# Patient Record
Sex: FEMALE | Race: BLACK OR AFRICAN AMERICAN | NOT HISPANIC OR LATINO | Employment: PART TIME | URBAN - METROPOLITAN AREA
[De-identification: names, ages, dates, MRNs, and addresses within clinical notes are randomized per-mention and may not be internally consistent; named-entity substitution may affect disease eponyms.]

---
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
## 2022-01-30 ENCOUNTER — HEALTH MAINTENANCE LETTER (OUTPATIENT)
Age: 22
End: 2022-01-30

## 2022-09-24 ENCOUNTER — HEALTH MAINTENANCE LETTER (OUTPATIENT)
Age: 22
End: 2022-09-24

## 2023-05-08 ENCOUNTER — HEALTH MAINTENANCE LETTER (OUTPATIENT)
Age: 23
End: 2023-05-08

## 2023-09-12 ENCOUNTER — APPOINTMENT (OUTPATIENT)
Dept: RADIOLOGY | Facility: HOSPITAL | Age: 23
End: 2023-09-12
Payer: COMMERCIAL

## 2023-09-12 ENCOUNTER — NURSE TRIAGE (OUTPATIENT)
Dept: NURSING | Facility: CLINIC | Age: 23
End: 2023-09-12

## 2023-09-12 ENCOUNTER — HOSPITAL ENCOUNTER (EMERGENCY)
Facility: HOSPITAL | Age: 23
Discharge: HOME | End: 2023-09-12
Admitting: STUDENT IN AN ORGANIZED HEALTH CARE EDUCATION/TRAINING PROGRAM
Payer: COMMERCIAL

## 2023-09-12 VITALS
WEIGHT: 135 LBS | OXYGEN SATURATION: 98 % | HEIGHT: 65 IN | HEART RATE: 83 BPM | BODY MASS INDEX: 22.49 KG/M2 | RESPIRATION RATE: 20 BRPM | SYSTOLIC BLOOD PRESSURE: 121 MMHG | TEMPERATURE: 98.2 F | DIASTOLIC BLOOD PRESSURE: 62 MMHG

## 2023-09-12 DIAGNOSIS — B34.9 VIRAL ILLNESS: ICD-10-CM

## 2023-09-12 LAB
ATRIAL RATE - MUSE: 67 BPM
BASOPHILS # BLD AUTO: 0 10E3/UL (ref 0–0.2)
BASOPHILS NFR BLD AUTO: 1 %
DIASTOLIC BLOOD PRESSURE - MUSE: NORMAL MMHG
EOSINOPHIL # BLD AUTO: 0.1 10E3/UL (ref 0–0.7)
EOSINOPHIL NFR BLD AUTO: 1 %
ERYTHROCYTE [DISTWIDTH] IN BLOOD BY AUTOMATED COUNT: 11.9 % (ref 10–15)
FLUAV RNA SPEC QL NAA+PROBE: NEGATIVE
FLUBV RNA RESP QL NAA+PROBE: NEGATIVE
GROUP A STREP BY PCR: NOT DETECTED
HCT VFR BLD AUTO: 40.2 % (ref 35–47)
HGB BLD-MCNC: 13.1 G/DL (ref 11.7–15.7)
IMM GRANULOCYTES # BLD: 0 10E3/UL
IMM GRANULOCYTES NFR BLD: 0 %
INTERPRETATION ECG - MUSE: NORMAL
LYMPHOCYTES # BLD AUTO: 2.5 10E3/UL (ref 0.8–5.3)
LYMPHOCYTES NFR BLD AUTO: 46 %
MCH RBC QN AUTO: 30.1 PG (ref 26.5–33)
MCHC RBC AUTO-ENTMCNC: 32.6 G/DL (ref 31.5–36.5)
MCV RBC AUTO: 92 FL (ref 78–100)
MONOCYTES # BLD AUTO: 0.4 10E3/UL (ref 0–1.3)
MONOCYTES NFR BLD AUTO: 7 %
NEUTROPHILS # BLD AUTO: 2.4 10E3/UL (ref 1.6–8.3)
NEUTROPHILS NFR BLD AUTO: 45 %
NRBC # BLD AUTO: 0 10E3/UL
NRBC BLD AUTO-RTO: 0 /100
P AXIS - MUSE: 50 DEGREES
PLATELET # BLD AUTO: 201 10E3/UL (ref 150–450)
PR INTERVAL - MUSE: 178 MS
QRS DURATION - MUSE: 82 MS
QT - MUSE: 390 MS
QTC - MUSE: 412 MS
R AXIS - MUSE: 63 DEGREES
RBC # BLD AUTO: 4.35 10E6/UL (ref 3.8–5.2)
RSV RNA SPEC NAA+PROBE: NEGATIVE
SARS-COV-2 RNA RESP QL NAA+PROBE: NEGATIVE
SYSTOLIC BLOOD PRESSURE - MUSE: NORMAL MMHG
T AXIS - MUSE: 28 DEGREES
TROPONIN T SERPL HS-MCNC: <6 NG/L
VENTRICULAR RATE- MUSE: 67 BPM
WBC # BLD AUTO: 5.4 10E3/UL (ref 4–11)

## 2023-09-12 PROCEDURE — 84484 ASSAY OF TROPONIN QUANT: CPT

## 2023-09-12 PROCEDURE — 36415 COLL VENOUS BLD VENIPUNCTURE: CPT

## 2023-09-12 PROCEDURE — 85025 COMPLETE CBC W/AUTO DIFF WBC: CPT

## 2023-09-12 PROCEDURE — 87637 SARSCOV2&INF A&B&RSV AMP PRB: CPT | Performed by: STUDENT IN AN ORGANIZED HEALTH CARE EDUCATION/TRAINING PROGRAM

## 2023-09-12 PROCEDURE — 99285 EMERGENCY DEPT VISIT HI MDM: CPT | Mod: 25

## 2023-09-12 PROCEDURE — 93005 ELECTROCARDIOGRAM TRACING: CPT

## 2023-09-12 PROCEDURE — 87651 STREP A DNA AMP PROBE: CPT | Performed by: STUDENT IN AN ORGANIZED HEALTH CARE EDUCATION/TRAINING PROGRAM

## 2023-09-12 PROCEDURE — 71046 X-RAY EXAM CHEST 2 VIEWS: CPT

## 2023-09-12 RX ORDER — BENZONATATE 100 MG/1
100 CAPSULE ORAL 3 TIMES DAILY PRN
Qty: 20 CAPSULE | Refills: 0 | Status: SHIPPED | OUTPATIENT
Start: 2023-09-12 | End: 2023-09-13

## 2023-09-12 RX ORDER — GUAIFENESIN 600 MG/1
1200 TABLET, EXTENDED RELEASE ORAL 2 TIMES DAILY
Qty: 20 TABLET | Refills: 0 | Status: SHIPPED | OUTPATIENT
Start: 2023-09-12 | End: 2023-09-13

## 2023-09-12 ASSESSMENT — ENCOUNTER SYMPTOMS
COUGH: 1
CHILLS: 1
SHORTNESS OF BREATH: 1
CHEST TIGHTNESS: 1

## 2023-09-12 NOTE — ED TRIAGE NOTES
"The pt has had a cough since 9/2/2023. No hx of asthma. Pt was seen on 9/2 and covid was negative. Pt is coughing up clear mucus. The pt states that she feel more sob, \"I coughed so much I felt exhausted. \" Denies fevers; pt does feel like she has chills.         "

## 2023-09-12 NOTE — ED PROVIDER NOTES
EMERGENCY DEPARTMENT ENCOUNTER      NAME: Smith Rogel  AGE: 23 year old female  YOB: 2000  MRN: [id number]  EVALUATION DATE & TIME: No admission date for patient encounter.    PCP: Magalis Sanchez    ED PROVIDER: Tierra Patterson PA-C      Chief Complaint   Patient presents with    Cough    Shortness of Breath     FINAL IMPRESSION:  1. Viral illness        ED COURSE & MEDICAL DECISION MAKING:    Pertinent Labs & Imaging studies reviewed. (See chart for details)  23 year old female presents to the Emergency Department for evaluation of cough,  congestion and shortness of breath.  Patient reports that she was seen in urgent care on 9/2/2023 for cough.  At that time she had a negative COVID test.  Vital signs reviewed and unremarkable.  Afebrile.  Patient is satting at 98% on room air.  On exam, cardiac and pulm exams unremarkable.  Patient is speaking in full sentences.  No sinus tenderness.  No rhinorrhea.  Posterior oropharynx is nonerythematous.  Uvula is midline.  Trachea is midline.  Normal range of motion of neck.  Abdomen is soft and nontender.  No rash. Exam is unremarkable.     Differential diagnosis includes COVID, influenza, RSV, viral illness, pneumonia, pneumothorax, hemothorax, ACS, pericarditis, myocarditis.  PE unlikely due to the patient being PERC negative.  No birth control.  Influenza, RSV, COVID was negative.  Strep was negative.  CBC showed no white blood cell elevation.  Troponin is not elevated.  Delta troponin is not indicated at this time.  EKG shows no STEMI.  Chest x-ray shows lungs are clear.  Heart and pulmonary vascularity are normal.  No signs of acute disease.  Resting comfortably throughout the ED visit.  Was educated on her imaging and lab results.    Symptoms consistent with a viral illness.  She was prescribed Tessalon Perles and Mucinex to help with her coughing.  Follow-up with her primary care doctor discuss her ED visit.  Patient return to the ED if new  symptoms develop or symptoms worsen.  Patient agrees with plan.  All questions answered.      ED COURSE:   2:29 PM I saw the patient.   3:47 PM I updated the patient on her imaging and lab results.  Patient be discharged home.  Patient agrees with plan.  All questions answered.       At the conclusion of the encounter I discussed the results of all of the tests and the disposition. The questions were answered. The patient or family acknowledged understanding and was agreeable with the care plan.     0 minutes of critical care time       Additional Documentation    History:  Supplemental history from: Documented in chart, if applicable  External Record(s) reviewed: Documented in chart, if applicable.    Work Up:  Chart documentation includes differential considered and any EKGs or imaging interpreted by provider.  In additional to work up documented, I considered the following work up: Documented in chart, if applicable.    External consultation:  Discussion of management with another provider: Documented in chart, if applicable    Complicating factors:  Care impacted by chronic illness: N/A  Care affected by social determinants of health: N/A    Disposition considerations: Discharge. I prescribed additional prescription strength medication(s) as charted. See documentation for any additional details.      MEDICATIONS GIVEN IN THE EMERGENCY:  Medications - No data to display    NEW PRESCRIPTIONS STARTED AT TODAY'S ER VISIT  New Prescriptions    BENZONATATE (TESSALON) 100 MG CAPSULE    Take 1 capsule (100 mg) by mouth 3 times daily as needed for cough    GUAIFENESIN (MUCINEX) 600 MG 12 HR TABLET    Take 2 tablets (1,200 mg) by mouth 2 times daily      =================================================================    HPI    Patient information was obtained from: patient    Use of : N/A       Smith Rogel is a 23 year old female with no pertinent history who presents to this ED via self walk-in for  evaluation of cough.    Patient reports persistent cough for the past 3 weeks. She states that she was seen in  on 9/2/23 and had a negative covid test and was advise to return for worsening symptoms. Last night (9/11/23), she states the coughing was so persistent she had a hard time catching her breath. She also had some production of clear phlegm with the cough. She associates minimal shortness of breath, chills, and chest tightness. She has been using Dayquil, Nyquil, Claritin, and benadryl with no relief. She is not on birth control.     Denies congestion, rhinorrhea, neck pain, abdominal pain, nausea, vomiting, dizziness, headache, lightheadedness. There were no other concerns/complaints at this time.      REVIEW OF SYSTEMS   Review of Systems   Constitutional:  Positive for chills.   HENT:  Positive for congestion.    Respiratory:  Positive for cough, chest tightness and shortness of breath (minimal, secondary to cough).    All other systems reviewed and are negative.       PAST MEDICAL HISTORY:  Past Medical History:   Diagnosis Date    NO ACTIVE PROBLEMS        PAST SURGICAL HISTORY:  Past Surgical History:   Procedure Laterality Date    NO PAST SURGERIES      none       CURRENT MEDICATIONS:    benzonatate (TESSALON) 100 MG capsule  guaiFENesin (MUCINEX) 600 MG 12 hr tablet  naproxen (NAPROSYN) 500 MG tablet  polyethylene glycol (MIRALAX/GLYCOLAX) powder    ALLERGIES:  No Known Allergies    FAMILY HISTORY:  History reviewed. No pertinent family history.    SOCIAL HISTORY:   Social History     Socioeconomic History    Marital status: Single   Tobacco Use    Smoking status: Never    Smokeless tobacco: Never    Tobacco comments:     smoke free household.   Substance and Sexual Activity    Alcohol use: No    Drug use: No    Sexual activity: Never   Social History Narrative    ** Merged History Encounter **            VITALS:  /62 (Cuff Size: Adult Regular)   Pulse 83   Temp 98.2  F (36.8  C)   Resp 20  "  Ht 1.651 m (5' 5\")   Wt 61.2 kg (135 lb)   SpO2 98%   BMI 22.47 kg/m      PHYSICAL EXAM    Physical Exam  Vitals and nursing note reviewed.   Constitutional:       General: She is not in acute distress.     Appearance: Normal appearance. She is not ill-appearing, toxic-appearing or diaphoretic.      Interventions: She is not intubated.     Comments: Speaking in full sentences.    HENT:      Head: Atraumatic.      Jaw: There is normal jaw occlusion.      Right Ear: Hearing, tympanic membrane, ear canal and external ear normal.      Left Ear: Hearing, tympanic membrane, ear canal and external ear normal.      Nose: Nose normal.      Right Sinus: No maxillary sinus tenderness or frontal sinus tenderness.      Left Sinus: No maxillary sinus tenderness or frontal sinus tenderness.      Mouth/Throat:      Mouth: Mucous membranes are moist.      Tongue: No lesions. Tongue does not deviate from midline.      Palate: No mass and lesions.      Pharynx: Oropharynx is clear. Uvula midline. No pharyngeal swelling, oropharyngeal exudate, posterior oropharyngeal erythema or uvula swelling.      Tonsils: No tonsillar exudate or tonsillar abscesses.   Eyes:      Conjunctiva/sclera: Conjunctivae normal.      Pupils: Pupils are equal, round, and reactive to light.   Neck:      Trachea: Trachea and phonation normal.   Cardiovascular:      Rate and Rhythm: Normal rate and regular rhythm.      Pulses: Normal pulses.      Heart sounds: Normal heart sounds. No murmur heard.     No friction rub. No gallop.   Pulmonary:      Effort: Pulmonary effort is normal. No tachypnea, bradypnea, accessory muscle usage or respiratory distress. She is not intubated.      Breath sounds: Normal breath sounds. No stridor. No decreased breath sounds, wheezing, rhonchi or rales.   Chest:      Chest wall: No mass, deformity, tenderness, crepitus or edema. There is no dullness to percussion.   Abdominal:      General: Bowel sounds are normal.      " Palpations: Abdomen is soft.      Tenderness: There is no abdominal tenderness. There is no guarding or rebound.   Musculoskeletal:         General: Normal range of motion.      Cervical back: Full passive range of motion without pain and normal range of motion.   Lymphadenopathy:      Cervical: No cervical adenopathy.   Skin:     General: Skin is dry.      Capillary Refill: Capillary refill takes less than 2 seconds.      Findings: No rash.   Neurological:      General: No focal deficit present.      Mental Status: She is alert and oriented to person, place, and time. Mental status is at baseline.   Psychiatric:         Mood and Affect: Mood normal.         Thought Content: Thought content normal.          LAB:  All pertinent labs reviewed and interpreted.  Labs Ordered and Resulted from Time of ED Arrival to Time of ED Departure   INFLUENZA A/B, RSV, & SARS-COV2 PCR - Normal       Result Value    Influenza A PCR Negative      Influenza B PCR Negative      RSV PCR Negative      SARS CoV2 PCR Negative     TROPONIN T, HIGH SENSITIVITY - Normal    Troponin T, High Sensitivity <6     GROUP A STREPTOCOCCUS PCR THROAT SWAB - Normal    Group A strep by PCR Not Detected     CBC WITH PLATELETS AND DIFFERENTIAL    WBC Count 5.4      RBC Count 4.35      Hemoglobin 13.1      Hematocrit 40.2      MCV 92      MCH 30.1      MCHC 32.6      RDW 11.9      Platelet Count 201      % Neutrophils 45      % Lymphocytes 46      % Monocytes 7      % Eosinophils 1      % Basophils 1      % Immature Granulocytes 0      NRBCs per 100 WBC 0      Absolute Neutrophils 2.4      Absolute Lymphocytes 2.5      Absolute Monocytes 0.4      Absolute Eosinophils 0.1      Absolute Basophils 0.0      Absolute Immature Granulocytes 0.0      Absolute NRBCs 0.0          RADIOLOGY:  Reviewed all pertinent imaging. Please see official radiology report.  Chest XR,  PA & LAT   Final Result   IMPRESSION: Lungs are clear. Heart and pulmonary vascularity are  normal. No signs of acute disease.          EKG:    Performed at: 9/12/2023 14:46:35  Impression: Sinus rhythm with marked sinus arrhythmia with occasional premature ventricular complexes with junctional escape complexes, Possible Left atrial enlargement, No STEMI, Borderline ECG  Rate: 67  Rhythm: Sinus rhythm  Axis: 63  OK Interval: 178  QRS Interval: 82  QTc Interval: 412  ST Changes: None  Comparison: No previous ECG for comparison  Dr. Painter have independently reviewed and interpreted the EKG(s) documented above.      I, Ashley Head, am serving as a scribe to document services personally performed by Tierra Patterson PA-C, based on my observation and the provider's statements to me. I, Tierra Patterson PA-C, attest that Ashley Head is acting in a scribe capacity, has observed my performance of the services and has documented them in accordance with my direction.    Tierra Patterson PA-C  [facility] EMERGENCY DEPARTMENT  [address] [place] [id number]  [phone]     Tierra Patterson PA-C  09/12/23 [id number]

## 2023-09-12 NOTE — DISCHARGE INSTRUCTIONS
Rest.  Drink lots of water.  Take Mucinex as needed for your congestion.  Take Tessalon Perles for coughing.  Return to the ED if new symptoms develop or symptoms worsen.  Follow-up with your primary care doctor to discuss your ED visit.

## 2023-09-13 RX ORDER — GUAIFENESIN 600 MG/1
1200 TABLET, EXTENDED RELEASE ORAL 2 TIMES DAILY
Qty: 20 TABLET | Refills: 0 | Status: SHIPPED | OUTPATIENT
Start: 2023-09-13 | End: 2023-12-26

## 2023-09-13 RX ORDER — BENZONATATE 100 MG/1
100 CAPSULE ORAL 3 TIMES DAILY PRN
Qty: 20 CAPSULE | Refills: 0 | Status: SHIPPED | OUTPATIENT
Start: 2023-09-13 | End: 2023-12-26

## 2023-09-13 NOTE — TELEPHONE ENCOUNTER
"Triage Call:    Caller: Patient  Patient seen in the ER today.  Her lab results are in the chart and \"someone updated the ECG\".  She is wondering if she should be concerned.  Advised that  the cardiologist reading it doesn't have all of the other assessment pieces taht the ER provider had to make an assessment.  Reinforced they advised to follow ER provider interpretation.   Advised to make ER follow-up visit with PCP as advised.     Protocol Recommended Disposition: Home care    Caller verbalized understanding of instructions and questions answered.      Yumi Velasco RN on 9/12/2023 at 7:26 PM      Reason for Disposition    Caller requesting routine or non-urgent lab result    Protocols used: PCP Call - No Triage-A-    "

## 2023-12-18 ENCOUNTER — OFFICE VISIT (OUTPATIENT)
Dept: FAMILY MEDICINE | Facility: CLINIC | Age: 23
End: 2023-12-18
Payer: COMMERCIAL

## 2023-12-18 VITALS
TEMPERATURE: 98.4 F | WEIGHT: 148 LBS | SYSTOLIC BLOOD PRESSURE: 113 MMHG | DIASTOLIC BLOOD PRESSURE: 74 MMHG | HEIGHT: 66 IN | RESPIRATION RATE: 20 BRPM | OXYGEN SATURATION: 98 % | HEART RATE: 84 BPM | BODY MASS INDEX: 23.78 KG/M2

## 2023-12-18 DIAGNOSIS — M79.604 PAIN IN BOTH LOWER EXTREMITIES: ICD-10-CM

## 2023-12-18 DIAGNOSIS — G25.81 RESTLESS LEG SYNDROME: Primary | ICD-10-CM

## 2023-12-18 DIAGNOSIS — H61.22 IMPACTED CERUMEN OF LEFT EAR: ICD-10-CM

## 2023-12-18 DIAGNOSIS — M79.605 PAIN IN BOTH LOWER EXTREMITIES: ICD-10-CM

## 2023-12-18 LAB
ALBUMIN UR-MCNC: 30 MG/DL
APPEARANCE UR: CLEAR
BILIRUB UR QL STRIP: ABNORMAL
COLOR UR AUTO: YELLOW
ERYTHROCYTE [DISTWIDTH] IN BLOOD BY AUTOMATED COUNT: 11.8 % (ref 10–15)
FERRITIN SERPL-MCNC: 40 NG/ML (ref 6–175)
GLUCOSE UR STRIP-MCNC: NEGATIVE MG/DL
HCT VFR BLD AUTO: 40.5 % (ref 35–47)
HGB BLD-MCNC: 13.1 G/DL (ref 11.7–15.7)
HGB UR QL STRIP: NEGATIVE
KETONES UR STRIP-MCNC: NEGATIVE MG/DL
LEUKOCYTE ESTERASE UR QL STRIP: NEGATIVE
MCH RBC QN AUTO: 30 PG (ref 26.5–33)
MCHC RBC AUTO-ENTMCNC: 32.3 G/DL (ref 31.5–36.5)
MCV RBC AUTO: 93 FL (ref 78–100)
NITRATE UR QL: NEGATIVE
PH UR STRIP: 6.5 [PH] (ref 5–7)
PLATELET # BLD AUTO: 205 10E3/UL (ref 150–450)
RBC # BLD AUTO: 4.37 10E6/UL (ref 3.8–5.2)
SP GR UR STRIP: >=1.03 (ref 1–1.03)
UROBILINOGEN UR STRIP-ACNC: 1 E.U./DL
WBC # BLD AUTO: 5.4 10E3/UL (ref 4–11)

## 2023-12-18 PROCEDURE — 82728 ASSAY OF FERRITIN: CPT

## 2023-12-18 PROCEDURE — 99214 OFFICE O/P EST MOD 30 MIN: CPT | Mod: GC

## 2023-12-18 PROCEDURE — 81003 URINALYSIS AUTO W/O SCOPE: CPT

## 2023-12-18 PROCEDURE — 36415 COLL VENOUS BLD VENIPUNCTURE: CPT

## 2023-12-18 PROCEDURE — 85027 COMPLETE CBC AUTOMATED: CPT

## 2023-12-18 NOTE — PROGRESS NOTES
Preceptor Attestation:  Patient's case reviewed and discussed with the resident, Kaylan Holman MD, and I personally evaluated the patient. I agree with written assessment and plan of care.    Supervising Physician:  Jolie Webb MD   [facility]

## 2023-12-18 NOTE — PROGRESS NOTES
Assessment & Plan   Smith Rogel is a 23 year old female presenting for emergency department follow up. Patient is new to our clinic. She has been living in [place] where she has most recently received her care. On chart review, it appears she has a PMH notable for migraines, chronic abdominal pain, frequent ED visits.     Emergency Department Follow Up  LE Pain, Restless Leg Syndrome Symptoms   Patient evaluated at an emergency department in [place] on 12/10/23 for mild bilateral leg edema and pain. D-dimer was negative and there was low suspicion for DVT at that time, does not appear an US was obtained. Today patient states she has persistent symptoms, however on my exam her bilateral lower extremities are completely reassuring. Low suspicion for DVT. Also low suspicion for cardiac etiology given completely normal physical exam and absent cardiac history. Waxing and waning pain sounds consistent with a restless leg syndrome picture, will start work up by ruling out an iron deficiency anemia. Also recommending compression stockings for sensation of LE edema.    - CBC, ferritin, iron studies to assess for restless leg syndrome 2/2 JORJE    - UA to rule out nephrotic syndrome leading to perceived LE edema, low suspicion   - Compression stockings for symptom management     Left Ear Pain   Impacted Cerumen of Left Ear   Patient reports multiple ear infections in the last year despite multiple courses of antibiotics. Per my chart review, it appears physical exams have mainly called out impacted cerumen of the left ear. Consistent with my physical exam today; no canal erythema or edema but difficult to assess tympanic membrane. Patient persistent she would like ENT referral.    - Recommending daily debrox for cerumen impaction management    - ENT referral per patient request     Nathanael Mtz is a 23 year old, presenting for the following health issues:  ER F/U (FOR EAR INFECTION AND RECHECK BOTH LEG )    HPI   #  "leg swelling, bilateral   - history of tibia fracture years ago   - swelling comes and goes   - is painful as well   - started in the right (going on for years, but now worsened), now left is also affected   - described as dull  - starts at bottom of feet? Sometimes moves up to thighs?   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         Objective    /74   Pulse 84   Temp 98.4  F (36.9  C)   Resp 20   Ht 1.665 m (5' 5.55\")   Wt 67.1 kg (148 lb)   LMP 11/30/2023   SpO2 98%   BMI 24.22 kg/m    Body mass index is 24.22 kg/m .  Physical Exam   General: Sitting comfortably. No acute distress.   HEENT: Conjunctivae are clear, nonicteric.   Neck: No masses appreciated.  Respiratory: No respiratory distress. Lung sounds are clear without rales, ronchi, or wheezes.   Cardiac: RRR. No m/g/r. Brisk cap refill.  Abdominal: Abdomen is soft and non-tender without distention.  Extremities: Bilateral lower extremities normal in appearance. Perhaps a very trace amount of dependent edema around bilateral ankles, certainly non-pitting. No tenderness to palpation throughout bilateral calves. Skin of the area appears normal.   Skin: Skin in warm without rashes.  Neurological: Motor function is grossly normal.    Kaylan Holman MD PGY-2  [facility]              "

## 2023-12-21 ENCOUNTER — LAB (OUTPATIENT)
Dept: LAB | Facility: CLINIC | Age: 23
End: 2023-12-21
Payer: COMMERCIAL

## 2023-12-21 ENCOUNTER — OFFICE VISIT (OUTPATIENT)
Dept: INTERNAL MEDICINE | Facility: CLINIC | Age: 23
End: 2023-12-21
Payer: COMMERCIAL

## 2023-12-21 VITALS
DIASTOLIC BLOOD PRESSURE: 69 MMHG | SYSTOLIC BLOOD PRESSURE: 116 MMHG | WEIGHT: 147.4 LBS | OXYGEN SATURATION: 100 % | HEART RATE: 86 BPM | BODY MASS INDEX: 23.69 KG/M2 | HEIGHT: 66 IN

## 2023-12-21 DIAGNOSIS — Z11.4 SCREENING FOR HIV (HUMAN IMMUNODEFICIENCY VIRUS): Primary | ICD-10-CM

## 2023-12-21 DIAGNOSIS — Z11.59 NEED FOR HEPATITIS C SCREENING TEST: ICD-10-CM

## 2023-12-21 DIAGNOSIS — M79.662 PAIN IN BOTH LOWER LEGS: ICD-10-CM

## 2023-12-21 DIAGNOSIS — M79.661 PAIN IN BOTH LOWER LEGS: ICD-10-CM

## 2023-12-21 DIAGNOSIS — Z11.4 SCREENING FOR HIV (HUMAN IMMUNODEFICIENCY VIRUS): ICD-10-CM

## 2023-12-21 DIAGNOSIS — Z11.59 NEED FOR HEPATITIS C SCREENING TEST: Primary | ICD-10-CM

## 2023-12-21 DIAGNOSIS — Z12.4 CERVICAL CANCER SCREENING: ICD-10-CM

## 2023-12-21 LAB
BASOPHILS # BLD AUTO: 0 10E3/UL (ref 0–0.2)
BASOPHILS NFR BLD AUTO: 1 %
CK SERPL-CCNC: 46 U/L (ref 26–192)
CRP SERPL-MCNC: <3 MG/L
EOSINOPHIL # BLD AUTO: 0 10E3/UL (ref 0–0.7)
EOSINOPHIL NFR BLD AUTO: 1 %
ERYTHROCYTE [DISTWIDTH] IN BLOOD BY AUTOMATED COUNT: 12 % (ref 10–15)
ERYTHROCYTE [SEDIMENTATION RATE] IN BLOOD BY WESTERGREN METHOD: 10 MM/HR (ref 0–20)
HCT VFR BLD AUTO: 40.8 % (ref 35–47)
HCV AB SERPL QL IA: NONREACTIVE
HGB BLD-MCNC: 13.9 G/DL (ref 11.7–15.7)
HIV 1+2 AB+HIV1 P24 AG SERPL QL IA: NONREACTIVE
IMM GRANULOCYTES # BLD: 0 10E3/UL
IMM GRANULOCYTES NFR BLD: 0 %
LYMPHOCYTES # BLD AUTO: 2.1 10E3/UL (ref 0.8–5.3)
LYMPHOCYTES NFR BLD AUTO: 42 %
MCH RBC QN AUTO: 30.6 PG (ref 26.5–33)
MCHC RBC AUTO-ENTMCNC: 34.1 G/DL (ref 31.5–36.5)
MCV RBC AUTO: 90 FL (ref 78–100)
MONOCYTES # BLD AUTO: 0.3 10E3/UL (ref 0–1.3)
MONOCYTES NFR BLD AUTO: 6 %
MONOCYTES NFR BLD AUTO: NEGATIVE %
NEUTROPHILS # BLD AUTO: 2.5 10E3/UL (ref 1.6–8.3)
NEUTROPHILS NFR BLD AUTO: 50 %
NRBC # BLD AUTO: 0 10E3/UL
NRBC BLD AUTO-RTO: 0 /100
PLATELET # BLD AUTO: 199 10E3/UL (ref 150–450)
RBC # BLD AUTO: 4.54 10E6/UL (ref 3.8–5.2)
TSH SERPL DL<=0.005 MIU/L-ACNC: 0.68 UIU/ML (ref 0.3–4.2)
WBC # BLD AUTO: 4.9 10E3/UL (ref 4–11)

## 2023-12-21 PROCEDURE — 84443 ASSAY THYROID STIM HORMONE: CPT | Performed by: PATHOLOGY

## 2023-12-21 PROCEDURE — 99214 OFFICE O/P EST MOD 30 MIN: CPT | Mod: GC

## 2023-12-21 PROCEDURE — 85652 RBC SED RATE AUTOMATED: CPT | Performed by: PATHOLOGY

## 2023-12-21 PROCEDURE — 87389 HIV-1 AG W/HIV-1&-2 AB AG IA: CPT

## 2023-12-21 PROCEDURE — 36415 COLL VENOUS BLD VENIPUNCTURE: CPT | Performed by: PATHOLOGY

## 2023-12-21 PROCEDURE — 86308 HETEROPHILE ANTIBODY SCREEN: CPT

## 2023-12-21 PROCEDURE — 86038 ANTINUCLEAR ANTIBODIES: CPT

## 2023-12-21 PROCEDURE — 85025 COMPLETE CBC W/AUTO DIFF WBC: CPT | Performed by: PATHOLOGY

## 2023-12-21 PROCEDURE — 99000 SPECIMEN HANDLING OFFICE-LAB: CPT | Performed by: PATHOLOGY

## 2023-12-21 PROCEDURE — 86140 C-REACTIVE PROTEIN: CPT | Performed by: PATHOLOGY

## 2023-12-21 PROCEDURE — 82550 ASSAY OF CK (CPK): CPT | Performed by: PATHOLOGY

## 2023-12-21 PROCEDURE — 86803 HEPATITIS C AB TEST: CPT

## 2023-12-21 NOTE — PROGRESS NOTES
Smith is a 23 year old that presents in clinic today for the following:     Chief Complaint   Patient presents with    Leg Swelling     Pt here for leg swelling and pain primarily in right leg for past 2 weeks.           12/21/2023    11:58 AM   Additional Questions   Roomed by Heidi Zarate   Accompanied by N/A       Screenings as of 12/18/23     PHQ-2 Total Score (Adult) - Positive if 3 or more points; Administer   PHQ-9 if positive 2        Heidi Zarate at 12:03 PM on 12/21/2023

## 2023-12-21 NOTE — PROGRESS NOTES
"I, Catarino De Luna MD saw the patient with the resident, and agree with the resident's findings and plan of care as documented in the resident's note.  /69 (BP Location: Right arm, Patient Position: Sitting, Cuff Size: Adult Regular)   Pulse 86   Ht 1.665 m (5' 5.55\")   Wt 66.9 kg (147 lb 6.4 oz)   LMP 11/30/2023   SpO2 100%   BMI 24.12 kg/m    I personally reviewed vital signs and past record.  Key findings: BLE edema and pain for a month. Testing was reassuring. Weight gain. Possible objective or volitional weakness proximally.  My PE consistent with either angioedema or mostly-resolved edema with no pitting. Involves ankles but not distal feet today. DDx postinfectious or (possibly) NSAIDs, which she takes for cramps and migraines.    "

## 2023-12-21 NOTE — PROGRESS NOTES
"                     PRIMARY CARE CENTER       SUBJECTIVE:  Smith Rogel is a 23 year old female with no significant PMH who comes in for leg swelling and pain.     Patient was recently seen in the ED in [place] for bilateral lower extremity edema, initially appeared in right leg but no bilaterally.  Patient reports edema has been going on for about a month.  Patient's vitals were normal at that time with an EKG revealing normal sinus with no acute ischemia.  Workup was unremarkable including CBC, metabolic panel, negative troponin, BNP 22, negative D-dimer.  Pregnancy screening test was negative at that time.    Now, pt reports edema in both legs, but pain is worse now. Has tried Salon pas (icy hot patches) not helping. Pain is worse in the right leg throughout, but worse from knee down. These symptoms started about a month ago, progressively getting worse. Pt is not aware of any triggers for pain, occur both at rest and with exertion. She describes pain as dull, but with walking she feels \"like knives going up my leg from my foot\". She denies any numbness. Denies any recent injuries, she had a right tibial fracture in 2017, did not have any surgical procedures.     Denies any rashes, face swelling, lip swelling. She has gained 17 lbs since August, no significant changes in diet. Denies any constant fatigue, hair loss, cold intolerance. Per chart graph, she has gained 13 lbs since this past September.     No new meds, did not start any new sports/physical activities.    Of note patient was also treated for an acute otitis media with Augmentin on 10/15/2023    Not aware of any autoimmune disease in family. Sister had thyroid removed this past April, unclear why \"They saw something on it\".    Not smoking, no alcohol, no recreation drugs. No new supplements. Sexually active with one male, uses condoms at all times.     Medications and allergies reviewed by me today.     ROS:   Constitutional, neuro, ENT, endocrine, " "pulmonary, cardiac, gastrointestinal, genitourinary, musculoskeletal, integument and psychiatric systems are negative, except as otherwise noted.    OBJECTIVE:    /69 (BP Location: Right arm, Patient Position: Sitting, Cuff Size: Adult Regular)   Pulse 86   Ht 1.665 m (5' 5.55\")   Wt 66.9 kg (147 lb 6.4 oz)   LMP 11/30/2023   SpO2 100%   BMI 24.12 kg/m     Wt Readings from Last 1 Encounters:   12/21/23 66.9 kg (147 lb 6.4 oz)     Gen: NAD, alert, cooperative, non-toxic  HEENT: EOMI, no conjunctival icterus, tracking appropriately, no thyromegaly  Resp: CTAB, no crackles or wheezes, no increased WOB  Cardiac: RRR, no S3/S4, no M/R/G appreciated  GI: soft, non-distended, mild epigastric tenderness to palpation  MSK: WWP, no erythema, non-pitting edema b/l. Muscle strength (hip flexion) slightly decreased to resistance, R>L. Bilateral legs tender to light touch, R>L. No shoulder tenderness, no synovitis noted on joint exam  Neuro: AOx3, sensation intact b/l, no focal deficits.   Psych: appropriate mood & affect       ASSESSMENT/PLAN:  Smith Rogel is a 23 year old female with no significant PMH who comes in for leg swelling and pain.      #Bilateral LE pain  #Bilateral LE Angioedema  Ongoing worsening bilateral lower extremity pain, R>L associated with intermittent edema. Recently seen in ED while in [place] where Workup was unremarkable including CBC, metabolic panel, negative troponin, BNP 22, negative D-dimer.  Pregnancy screening test was negative at that time.  Unknown etiology at this time, patient reports having a cold a couple months ago, wondering if patient had mononucleosis at that time.  Most recent CBC with differential showed lymphocyte predominance 49.5. Of note patient was treated with Augmentin for acute otitis media om 10/15/23, angioedema due to antibiotics remains a possibility, however would not explain the pain.  Differential diagnosis also includes autoimmune/rheumatological " etiologies, other viral infections.  Vascular etiologies such as DVTs highly unlikely given bilateral lower extremity involvement with a negative D-dimer.  -     Mononucleosis screen; Future  -     CK total; Future  -     CRP, inflammation; Future  -     TSH with free T4 reflex; Future  -     CBC with platelets and differential; Future  -     ESR: Erythrocyte sedimentation rate; Future  -     Anti Nuclear Ana Laura IgG by IFA with Reflex; Future  -     HIV Screening  -     Continue ibuprofen, Tylenol for pain control as needed.      Pt should return to clinic for f/u if symptoms not improving.  Patient is going to [place] for the week, instructed her to make an appointment as needed when she is back.    Dominique Wheeler DO, PGY2  Internal Medicine  [facility]   Clinic phone [phone]  Dec 21, 2023    Pt was seen and plan of care discussed with HIWOT KEVIN

## 2023-12-22 LAB — ANA SER QL IF: NEGATIVE

## 2023-12-26 ENCOUNTER — TELEPHONE (OUTPATIENT)
Dept: INTERNAL MEDICINE | Facility: CLINIC | Age: 23
End: 2023-12-26
Payer: COMMERCIAL

## 2023-12-26 NOTE — TELEPHONE ENCOUNTER
[facility] Call Center    Phone Message    May a detailed message be left on voicemail: yes     Reason for Call: Other: The patient would like a call to discuss her 12/21/23 visits and the medication suggested called Naproxen, please review and follow up thank you.     Action Taken: Message routed to:  [facility] ([facility] pcc    Travel Screening: Not Applicable

## 2023-12-27 ENCOUNTER — MYC MEDICAL ADVICE (OUTPATIENT)
Dept: INTERNAL MEDICINE | Facility: CLINIC | Age: 23
End: 2023-12-27
Payer: COMMERCIAL

## 2023-12-27 NOTE — TELEPHONE ENCOUNTER
Called patient- unable to leave voicemail. Will send mychart.    Tommy Ortiz RN on 12/27/2023 at 2:49 PM

## 2024-02-01 ENCOUNTER — OFFICE VISIT (OUTPATIENT)
Dept: INTERNAL MEDICINE | Facility: CLINIC | Age: 24
End: 2024-02-01
Payer: COMMERCIAL

## 2024-02-01 ENCOUNTER — LAB (OUTPATIENT)
Dept: LAB | Facility: CLINIC | Age: 24
End: 2024-02-01
Payer: COMMERCIAL

## 2024-02-01 VITALS
BODY MASS INDEX: 24.77 KG/M2 | HEART RATE: 78 BPM | OXYGEN SATURATION: 99 % | DIASTOLIC BLOOD PRESSURE: 64 MMHG | SYSTOLIC BLOOD PRESSURE: 106 MMHG | WEIGHT: 151.4 LBS

## 2024-02-01 DIAGNOSIS — M79.661 PAIN IN BOTH LOWER LEGS: ICD-10-CM

## 2024-02-01 DIAGNOSIS — M79.662 PAIN IN BOTH LOWER LEGS: ICD-10-CM

## 2024-02-01 DIAGNOSIS — Z12.4 CERVICAL CANCER SCREENING: Primary | ICD-10-CM

## 2024-02-01 PROCEDURE — 99213 OFFICE O/P EST LOW 20 MIN: CPT | Mod: GE

## 2024-02-01 PROCEDURE — 99000 SPECIMEN HANDLING OFFICE-LAB: CPT | Performed by: PATHOLOGY

## 2024-02-01 PROCEDURE — 86235 NUCLEAR ANTIGEN ANTIBODY: CPT

## 2024-02-01 PROCEDURE — 36415 COLL VENOUS BLD VENIPUNCTURE: CPT | Performed by: PATHOLOGY

## 2024-02-01 NOTE — PROGRESS NOTES
Smith is a 24 year old that presents in clinic today for the following:     Chief Complaint   Patient presents with    Follow Up     1. R Leg swelling still persists   2. Prescription for pain medication            2/1/2024     1:28 PM   Additional Questions   Roomed by YW   Accompanied by No     Screenings from encounters over the past 10 days    No data recorded       NOREEN Abebe at 1:30 PM on 2/1/2024
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
## 2024-02-02 ENCOUNTER — TELEPHONE (OUTPATIENT)
Dept: INTERNAL MEDICINE | Facility: CLINIC | Age: 24
End: 2024-02-02
Payer: COMMERCIAL

## 2024-02-02 LAB
ENA SM IGG SER IA-ACNC: 1 U/ML
ENA SM IGG SER IA-ACNC: NEGATIVE
ENA SS-A AB SER IA-ACNC: 0.9 U/ML
ENA SS-A AB SER IA-ACNC: NEGATIVE
ENA SS-B IGG SER IA-ACNC: 0.7 U/ML
ENA SS-B IGG SER IA-ACNC: NEGATIVE
U1 SNRNP IGG SER IA-ACNC: 1.3 U/ML
U1 SNRNP IGG SER IA-ACNC: NEGATIVE

## 2024-02-02 NOTE — TELEPHONE ENCOUNTER
Sent [employer] (1st Attempt) for the patient to call back and schedule the following:    Appointment type: [facility] Return  Provider: Summer  Return date: 3/2

## 2024-02-07 ENCOUNTER — TELEPHONE (OUTPATIENT)
Dept: INTERNAL MEDICINE | Facility: CLINIC | Age: 24
End: 2024-02-07
Payer: COMMERCIAL

## 2024-06-10 ASSESSMENT — SOCIAL DETERMINANTS OF HEALTH (SDOH): HOW OFTEN DO YOU GET TOGETHER WITH FRIENDS OR RELATIVES?: MORE THAN THREE TIMES A WEEK

## 2024-06-11 ENCOUNTER — ANCILLARY PROCEDURE (OUTPATIENT)
Dept: GENERAL RADIOLOGY | Facility: CLINIC | Age: 24
End: 2024-06-11
Attending: NURSE PRACTITIONER
Payer: COMMERCIAL

## 2024-06-11 ENCOUNTER — OFFICE VISIT (OUTPATIENT)
Dept: INTERNAL MEDICINE | Facility: CLINIC | Age: 24
End: 2024-06-11
Payer: COMMERCIAL

## 2024-06-11 ENCOUNTER — LAB (OUTPATIENT)
Dept: LAB | Facility: CLINIC | Age: 24
End: 2024-06-11
Payer: COMMERCIAL

## 2024-06-11 VITALS
DIASTOLIC BLOOD PRESSURE: 75 MMHG | HEIGHT: 65 IN | SYSTOLIC BLOOD PRESSURE: 112 MMHG | HEART RATE: 84 BPM | BODY MASS INDEX: 25.43 KG/M2 | OXYGEN SATURATION: 98 % | WEIGHT: 152.6 LBS

## 2024-06-11 DIAGNOSIS — M25.561 CHRONIC PAIN OF RIGHT KNEE: ICD-10-CM

## 2024-06-11 DIAGNOSIS — M79.89 RIGHT LEG SWELLING: Primary | ICD-10-CM

## 2024-06-11 DIAGNOSIS — M54.41 CHRONIC RIGHT-SIDED LOW BACK PAIN WITH RIGHT-SIDED SCIATICA: ICD-10-CM

## 2024-06-11 DIAGNOSIS — M79.89 RIGHT LEG SWELLING: ICD-10-CM

## 2024-06-11 DIAGNOSIS — G89.29 CHRONIC RIGHT-SIDED LOW BACK PAIN WITH RIGHT-SIDED SCIATICA: ICD-10-CM

## 2024-06-11 DIAGNOSIS — G89.29 CHRONIC PAIN OF RIGHT KNEE: ICD-10-CM

## 2024-06-11 DIAGNOSIS — Z11.3 SCREENING FOR STDS (SEXUALLY TRANSMITTED DISEASES): Primary | ICD-10-CM

## 2024-06-11 DIAGNOSIS — L50.9 HIVES: ICD-10-CM

## 2024-06-11 LAB
ALBUMIN SERPL BCG-MCNC: 4.6 G/DL (ref 3.5–5.2)
ALP SERPL-CCNC: 57 U/L (ref 40–150)
ALT SERPL W P-5'-P-CCNC: 12 U/L (ref 0–50)
ANION GAP SERPL CALCULATED.3IONS-SCNC: 8 MMOL/L (ref 7–15)
AST SERPL W P-5'-P-CCNC: 18 U/L (ref 0–45)
BASOPHILS # BLD AUTO: 0 10E3/UL (ref 0–0.2)
BASOPHILS NFR BLD AUTO: 0 %
BILIRUB SERPL-MCNC: 0.5 MG/DL
BUN SERPL-MCNC: 9.7 MG/DL (ref 6–20)
CALCIUM SERPL-MCNC: 9.4 MG/DL (ref 8.6–10)
CHLORIDE SERPL-SCNC: 106 MMOL/L (ref 98–107)
CHOLEST SERPL-MCNC: 148 MG/DL
CREAT SERPL-MCNC: 0.59 MG/DL (ref 0.51–0.95)
CRP SERPL-MCNC: <3 MG/L
D DIMER PPP FEU-MCNC: <0.27 UG/ML FEU (ref 0–0.5)
DEPRECATED HCO3 PLAS-SCNC: 26 MMOL/L (ref 22–29)
EGFRCR SERPLBLD CKD-EPI 2021: >90 ML/MIN/1.73M2
EOSINOPHIL # BLD AUTO: 0 10E3/UL (ref 0–0.7)
EOSINOPHIL NFR BLD AUTO: 1 %
ERYTHROCYTE [DISTWIDTH] IN BLOOD BY AUTOMATED COUNT: 12.3 % (ref 10–15)
ERYTHROCYTE [SEDIMENTATION RATE] IN BLOOD BY WESTERGREN METHOD: 9 MM/HR (ref 0–20)
FASTING STATUS PATIENT QL REPORTED: NORMAL
FASTING STATUS PATIENT QL REPORTED: NORMAL
GLUCOSE SERPL-MCNC: 95 MG/DL (ref 70–99)
HCT VFR BLD AUTO: 38.5 % (ref 35–47)
HDLC SERPL-MCNC: 63 MG/DL
HGB BLD-MCNC: 12.9 G/DL (ref 11.7–15.7)
IMM GRANULOCYTES # BLD: 0 10E3/UL
IMM GRANULOCYTES NFR BLD: 0 %
LDLC SERPL CALC-MCNC: 76 MG/DL
LYMPHOCYTES # BLD AUTO: 2.2 10E3/UL (ref 0.8–5.3)
LYMPHOCYTES NFR BLD AUTO: 44 %
MCH RBC QN AUTO: 30 PG (ref 26.5–33)
MCHC RBC AUTO-ENTMCNC: 33.5 G/DL (ref 31.5–36.5)
MCV RBC AUTO: 90 FL (ref 78–100)
MONOCYTES # BLD AUTO: 0.3 10E3/UL (ref 0–1.3)
MONOCYTES NFR BLD AUTO: 6 %
NEUTROPHILS # BLD AUTO: 2.5 10E3/UL (ref 1.6–8.3)
NEUTROPHILS NFR BLD AUTO: 49 %
NONHDLC SERPL-MCNC: 85 MG/DL
NRBC # BLD AUTO: 0 10E3/UL
NRBC BLD AUTO-RTO: 0 /100
PLATELET # BLD AUTO: 197 10E3/UL (ref 150–450)
POTASSIUM SERPL-SCNC: 4.5 MMOL/L (ref 3.4–5.3)
PROT SERPL-MCNC: 7.2 G/DL (ref 6.4–8.3)
RBC # BLD AUTO: 4.3 10E6/UL (ref 3.8–5.2)
SODIUM SERPL-SCNC: 140 MMOL/L (ref 135–145)
TRIGL SERPL-MCNC: 44 MG/DL
WBC # BLD AUTO: 5 10E3/UL (ref 4–11)

## 2024-06-11 PROCEDURE — G2211 COMPLEX E/M VISIT ADD ON: HCPCS | Performed by: NURSE PRACTITIONER

## 2024-06-11 PROCEDURE — 36415 COLL VENOUS BLD VENIPUNCTURE: CPT | Performed by: PATHOLOGY

## 2024-06-11 PROCEDURE — 72100 X-RAY EXAM L-S SPINE 2/3 VWS: CPT | Performed by: STUDENT IN AN ORGANIZED HEALTH CARE EDUCATION/TRAINING PROGRAM

## 2024-06-11 PROCEDURE — 99215 OFFICE O/P EST HI 40 MIN: CPT | Performed by: NURSE PRACTITIONER

## 2024-06-11 PROCEDURE — 87491 CHLMYD TRACH DNA AMP PROBE: CPT

## 2024-06-11 PROCEDURE — 86140 C-REACTIVE PROTEIN: CPT | Performed by: PATHOLOGY

## 2024-06-11 PROCEDURE — 80061 LIPID PANEL: CPT | Performed by: PATHOLOGY

## 2024-06-11 PROCEDURE — 85652 RBC SED RATE AUTOMATED: CPT | Performed by: PATHOLOGY

## 2024-06-11 PROCEDURE — 80053 COMPREHEN METABOLIC PANEL: CPT | Performed by: PATHOLOGY

## 2024-06-11 PROCEDURE — 85379 FIBRIN DEGRADATION QUANT: CPT | Performed by: NURSE PRACTITIONER

## 2024-06-11 PROCEDURE — 85025 COMPLETE CBC W/AUTO DIFF WBC: CPT | Performed by: PATHOLOGY

## 2024-06-11 PROCEDURE — 99000 SPECIMEN HANDLING OFFICE-LAB: CPT | Performed by: PATHOLOGY

## 2024-06-11 RX ORDER — CETIRIZINE HYDROCHLORIDE 10 MG/1
10 TABLET ORAL DAILY
COMMUNITY

## 2024-06-11 ASSESSMENT — ENCOUNTER SYMPTOMS
WHEEZING: 1
NERVOUS/ANXIOUS: 1

## 2024-06-11 NOTE — PROGRESS NOTES
Assessment and Plan   Ms. Rogel is a 24 year old female with history of anxiety, bilateral LE edema, musculoskeletal pain who presents for recurrent LE pain/swelling and for an episode of hives.    (M79.89) Right leg swelling  (primary encounter diagnosis)  (M54.41,  G89.29) Chronic right-sided low back pain with right-sided sciatica  (M25.561,  G89.29) Chronic pain of right knee  Plan: D dimer, quantitative,   CBC with platelets and differential,   Comprehensive metabolic panel (BMP + Alb, Alk Phos, ALT, AST, Total. Bili, TP),   Lipid panel reflex to direct LDL Fasting,   CRP, inflammation,   ESR: Erythrocyte sedimentation rate,  XR Lumbar Spine 2/3 Views,     Addendum 6/13/24: D-dimer and inflammatory markers negative.  Lumbar xray without abnormalities. Orthopedic referral for persistent knee discomfort. Advised increased walking, stretching and follow-through with previous PT referral.    (L50.9) Hives  Comment:  One-time and spontaneously-resolving episode following direct contact with peony flower.   Plan: Adult Allergy/Asthma  Referral  Benedryl 25 mg in addition to Zyrtec or other non-sedating antihistamine with recurrence of hives.  Proceed to ED with facial or throat swelling.      Most Recent Immunizations   Administered Date(s) Administered    Comvax (HIB/HepB) 08/29/2006    DTAP (<7y) 08/11/2008    HEPA 11/17/2010    HepB 08/11/2008    Influenza (IIV3) PF 11/17/2010    MMR 06/13/2012    MMR Not Indicated - By Titer 08/18/2006    Meningococcal ACWY (Menactra ) 08/26/2013    Pneumococcal (PCV 7) 11/30/2006    Poliovirus, inactivated (IPV) 08/18/2006    TDAP Vaccine (Boostrix) 08/26/2013    Varicella 08/09/2007       Return to clinic/Follow-up:  As needed and with no improvement, worsening, or new symptom development.     Options for treatment and follow-up care were reviewed with the patient. She engaged in the decision making process and verbalized understanding of the options discussed and  agreed with the final plan.    I spent a total of 45 minutes in the care of this pt today, including time prior to, during, and following today's office visit. This time includes reviewing the patient's chart and prior history, external notes, obtaining a history, performing an examination, interpreting test results, and evaluation and counseling the patient. This time also includes ordering medications or tests necessary in addition to communication to other member's of the patient's health care team. Time spent in documentation and care coordination is included.    Magalis Sanchez, ANP, AGACNP, APRN, CNP  Nurse Practitioner      __________________________    Subjective   Presenting Concern:  Ms. Rogel is a 24 year old female with history of anxiety, bilateral LE edema, musculoskeletal pain who presents for recurrent LE pain/ swelling and recent episode of facial hives.    Right leg pain, swelling:  This is a repeat evaluation for this concern by a 2nd provider. She reports R leg swelling and pain on and off for several months, rates severity 9/10 most days. Using solanpas patches.  She also reports low back pain and hip discomfort, R>L. No radiation to the legs, no saddle anesthesia or bowel/bladder concerns, no specific functional limitations.   She denies recent injuries or new exercise activities.  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 Amount of day-to-day physical activity varies but generally is sitting a lot during the day.   States she walks some and climbs stairs daily without specific additional leg pain, SOB, palpitations, or other associated symptoms. Does not smoke, no use of hormonal contraception, no recent travel.  Tibia fracture 2012 playing soccer which healed fine.  Was previously referred for physical therapy but has not yet pursued this.    Hives:  Facial hives on the right cheek lasting ~ 12 hours several days ago after touching her face following handling  "peony flowers. Denies swelling of throat, no SOB or difficulty breathing, no palpitations or chest discomfort.  Took zyrtec, applied cool compresses to her face.      Review of external notes as documented above       Past Medical History:  Past Medical History:   Diagnosis Date    NO ACTIVE PROBLEMS        Active Meds:  No current outpatient medications on file.     No current facility-administered medications for this visit.        Allergies:  Reviewed, refer to EMR      Review of Systems - negative except as document in HPI        Objective    /75 (BP Location: Right arm, Patient Position: Sitting, Cuff Size: Adult Regular)   Pulse 84   Ht 1.638 m (5' 4.5\")   Wt 69.2 kg (152 lb 9.6 oz)   LMP 06/07/2024 (Exact Date)   SpO2 98%   BMI 25.79 kg/m  \    Physical Exam   General appearance: alert, well appearing, and in no distress  Eyes: extra-ocular movements intact, pupils equally round and reactive bilaterally, no scleral icterus  Neck: no significant adenopathy, lymphadenopathy  Respiratory: Good air movement bilaterally, lungs clear, no wheezes/rales/rhonchi, symmetric air entry and excursion  Cardiovascular: normal rate and regular rhythm, S1 and S2 normal, no murmurs, rubs or gallops, peripheral pulses full/symmetric, no peripheral edema  GI: Abdomen soft, bowel sounds present, nondistended, nontender, no organomegaly or masses, no rebound/guarding  Musculoskeletal: Positive for mild bilateral knee swelling, 1+ LE edema. Negative Franci's. No obvious joint deformity at the knees or hips bilaterally. Full active back ROM including forward and lateral flexion. Knee, hip, lower back palpation without elicitation of tenderness. Normal LE muscle tone, normal gait, sensation. Active SLR elicits mild discomfort at the posterior thigh, R >L   Neurological: cranial nerves II through XII grossly intact, motor and sensory grossly normal bilaterally, normal muscle tone, no tremors, strength 5/5 throughout, " sensation to light touch intact  Skin: normal coloration and turgor. No evidence of hives   Psychiatric: normal mentation, affect and mood

## 2024-06-11 NOTE — PROGRESS NOTES
"Nathanael Mtz is a 24 year old, presenting for the following health issues:  Follow Up, Musculoskeletal Problem (Pt would like to follow up on right leg swelling and pain, began in February. Pain radiates into lower back, described as sharp. Pain comes and goes but lasts for days.), Anxiety, and Allergies (Pt reports allergic reaction on Sunday after touching flowers (peonies) and then touching her face. Hives, itching all over, feeling hot. )      6/11/2024     5:42 PM   Additional Questions   Roomed by [facility] EMT     Musculoskeletal Problem    Anxiety    Allergies          Objective    /75 (BP Location: Right arm, Patient Position: Sitting, Cuff Size: Adult Regular)   Pulse 84   Ht 1.638 m (5' 4.5\")   Wt 69.2 kg (152 lb 9.6 oz)   LMP 06/07/2024 (Exact Date)   SpO2 98%   BMI 25.79 kg/m    Body mass index is 25.79 kg/m .    Signed Electronically by: Magalis Sanchez NP  "

## 2024-06-12 LAB
C TRACH DNA SPEC QL PROBE+SIG AMP: NEGATIVE
N GONORRHOEA DNA SPEC QL NAA+PROBE: NEGATIVE

## 2024-07-14 ENCOUNTER — HEALTH MAINTENANCE LETTER (OUTPATIENT)
Age: 24
End: 2024-07-14

## 2024-08-05 ENCOUNTER — OFFICE VISIT (OUTPATIENT)
Dept: PHYSICAL MEDICINE AND REHAB | Facility: CLINIC | Age: 24
End: 2024-08-05
Attending: NURSE PRACTITIONER

## 2024-08-05 VITALS
BODY MASS INDEX: 25.87 KG/M2 | WEIGHT: 155.3 LBS | DIASTOLIC BLOOD PRESSURE: 67 MMHG | SYSTOLIC BLOOD PRESSURE: 109 MMHG | HEART RATE: 55 BPM | HEIGHT: 65 IN

## 2024-08-05 DIAGNOSIS — M54.16 RIGHT LUMBAR RADICULOPATHY: Primary | ICD-10-CM

## 2024-08-05 DIAGNOSIS — M79.89 RIGHT LEG SWELLING: ICD-10-CM

## 2024-08-05 DIAGNOSIS — M25.561 CHRONIC PAIN OF RIGHT KNEE: ICD-10-CM

## 2024-08-05 DIAGNOSIS — G89.29 CHRONIC RIGHT-SIDED LOW BACK PAIN WITH RIGHT-SIDED SCIATICA: ICD-10-CM

## 2024-08-05 DIAGNOSIS — G89.29 CHRONIC PAIN OF RIGHT KNEE: ICD-10-CM

## 2024-08-05 DIAGNOSIS — M54.41 CHRONIC RIGHT-SIDED LOW BACK PAIN WITH RIGHT-SIDED SCIATICA: ICD-10-CM

## 2024-08-05 PROCEDURE — 99204 OFFICE O/P NEW MOD 45 MIN: CPT | Performed by: NURSE PRACTITIONER

## 2024-08-05 RX ORDER — PREDNISONE 10 MG/1
TABLET ORAL
Qty: 18 TABLET | Refills: 0 | Status: SHIPPED | OUTPATIENT
Start: 2024-08-05

## 2024-08-05 ASSESSMENT — PAIN SCALES - GENERAL: PAINLEVEL: SEVERE PAIN (7)
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
## 2024-08-05 NOTE — PATIENT INSTRUCTIONS
~Spine Center Scheduling #[phone].  ~Please call our [facility] Nurse Navigation #[phone] with any questions or concerns about your treatment plan, if symptoms worsen and you would like to be seen urgently, or if you have problems controlling bladder and bowel function.  ~For any future flareups or new symptoms, recommend follow-up in clinic or contact the nurse navigator line.  ~Please note that any My Chart messages may take multiple days for a response due to the high volume of patients seen in clinic.  Anything sent Thursday night or after will be answered the following week when able, as Raya Ambriz CNP does not work in clinic on Fridays.   ~Raya Ambriz CNP is at the [facility] on Tuesdays, otherwise primarily at the [facility].       ~You have been referred for Physical Therapy to [facility]. They will call you to schedule an appointment.       Scheduling phone number is [phone] for [facility], or [place] location.  If you have not heard from the scheduling office within 2 business days, please call [phone] for ALL other locations.     Discussed the importance of core strengthening, ROM, stretching exercises and how each of these entities is important in decreasing pain and improving long term spine health.  The purpose of physical therapy is to teach you an individualized home exercise program.  These exercises need to be performed every day in order to decrease pain and prevent future occurrences of pain.            A Medrol Dose Pack (Prednisone Taper) was prescribed today for your acute pain. Please follow the dosing instruction on prescription bottle.     POSSIBLE STEROID SIDE EFFECTS   -If you experience these, it should only last for a short period-   Change in menstrual flow   Swelling   Increased appetite   Skin redness (flushness)   Skin rash   Mouth (oral) irritation   Blood sugar  (glucose) levels   Sweats   Mood changes       Imaging has been ordered. Radiology will call you to schedule. Please call below if you do not hear from them in the next couple of days.     [facility] Radiology Scheduling    Please call [phone] to schedule your image(s) (select option #1). There are 3 different locations near, see below.   There are imaging options for other locations as well, the imaging schedulers can help with other locations within [place].     [facility]  [address] Imaging - [place]  [address] [facility] [address], [address]   [facility] [address]    [facility]  [address] [address]
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
## 2024-08-07 ENCOUNTER — HOSPITAL ENCOUNTER (EMERGENCY)
Facility: HOSPITAL | Age: 24
Discharge: HOME | End: 2024-08-07
Attending: EMERGENCY MEDICINE | Admitting: EMERGENCY MEDICINE

## 2024-08-07 ENCOUNTER — APPOINTMENT (OUTPATIENT)
Dept: ULTRASOUND IMAGING | Facility: HOSPITAL | Age: 24
End: 2024-08-07
Attending: EMERGENCY MEDICINE

## 2024-08-07 VITALS
TEMPERATURE: 99 F | BODY MASS INDEX: 27.14 KG/M2 | HEART RATE: 98 BPM | WEIGHT: 159 LBS | DIASTOLIC BLOOD PRESSURE: 71 MMHG | SYSTOLIC BLOOD PRESSURE: 107 MMHG | HEIGHT: 64 IN | RESPIRATION RATE: 16 BRPM | OXYGEN SATURATION: 100 %

## 2024-08-07 DIAGNOSIS — M79.604 RIGHT LEG PAIN: ICD-10-CM

## 2024-08-07 PROCEDURE — 250N000011 HC RX IP 250 OP 636: Performed by: EMERGENCY MEDICINE

## 2024-08-07 PROCEDURE — 99285 EMERGENCY DEPT VISIT HI MDM: CPT | Mod: 25

## 2024-08-07 PROCEDURE — 250N000013 HC RX MED GY IP 250 OP 250 PS 637: Performed by: EMERGENCY MEDICINE

## 2024-08-07 PROCEDURE — 96372 THER/PROPH/DIAG INJ SC/IM: CPT | Performed by: EMERGENCY MEDICINE

## 2024-08-07 PROCEDURE — 93971 EXTREMITY STUDY: CPT | Mod: RT

## 2024-08-07 RX ORDER — CYCLOBENZAPRINE HCL 10 MG
10 TABLET ORAL 3 TIMES DAILY PRN
Qty: 20 TABLET | Refills: 0 | Status: SHIPPED | OUTPATIENT
Start: 2024-08-07

## 2024-08-07 RX ORDER — CYCLOBENZAPRINE HCL 10 MG
10 TABLET ORAL ONCE
Status: COMPLETED | OUTPATIENT
Start: 2024-08-07 | End: 2024-08-07

## 2024-08-07 RX ORDER — KETOROLAC TROMETHAMINE 15 MG/ML
15 INJECTION, SOLUTION INTRAMUSCULAR; INTRAVENOUS ONCE
Status: COMPLETED | OUTPATIENT
Start: 2024-08-07 | End: 2024-08-07

## 2024-08-07 RX ORDER — GABAPENTIN 100 MG/1
100 CAPSULE ORAL 3 TIMES DAILY
Qty: 30 CAPSULE | Refills: 0 | Status: SHIPPED | OUTPATIENT
Start: 2024-08-07

## 2024-08-07 RX ADMIN — KETOROLAC TROMETHAMINE 15 MG: 15 INJECTION, SOLUTION INTRAMUSCULAR; INTRAVENOUS at 01:15

## 2024-08-07 RX ADMIN — CYCLOBENZAPRINE 10 MG: 10 TABLET, FILM COATED ORAL at 01:15

## 2024-08-07 ASSESSMENT — COLUMBIA-SUICIDE SEVERITY RATING SCALE - C-SSRS
1. IN THE PAST MONTH, HAVE YOU WISHED YOU WERE DEAD OR WISHED YOU COULD GO TO SLEEP AND NOT WAKE UP?: NO
2. HAVE YOU ACTUALLY HAD ANY THOUGHTS OF KILLING YOURSELF IN THE PAST MONTH?: NO
6. HAVE YOU EVER DONE ANYTHING, STARTED TO DO ANYTHING, OR PREPARED TO DO ANYTHING TO END YOUR LIFE?: NO

## 2024-08-07 ASSESSMENT — ACTIVITIES OF DAILY LIVING (ADL)
ADLS_ACUITY_SCORE: 33
ADLS_ACUITY_SCORE: 35

## 2024-08-07 NOTE — DISCHARGE INSTRUCTIONS
You are seen in the emergency department for right leg pain.  Your evaluation included a DVT ultrasound which was negative.  We agree with the other recommendations from your clinic they saw a recently regarding your plans for a low back MRI and initiation of physical therapy.  You can continue on the steroid that they prescribed you and continue using Tylenol and ibuprofen every 6 hours for pain.  We are going to prescribe you Flexeril if you find this helpful for muscle spasms and pain and also a low-dose of gabapentin which is a medication that can help with nerve pain if you find it useful.  Continue to follow-up with your previous provider for evaluation of your ongoing leg pain.

## 2024-08-07 NOTE — ED PROVIDER NOTES
EMERGENCY DEPARTMENT ENCOUNTER      NAME: Smith Rogel  AGE: 24 year old female  YOB: 2000  MRN: [id number]  EVALUATION DATE & TIME: No admission date for patient encounter.    PCP: Dominique Wheeler    ED PROVIDER: Kem Soriano M.D.      Chief Complaint   Patient presents with    Leg Pain    Leg Swelling         FINAL IMPRESSION:  1. Right leg pain          ED COURSE & MEDICAL DECISION MAKIN year old female presents to the Emergency Department for evaluation of right leg pain.  Patient has had subacute to chronic right leg pain and swelling which is acutely worsened.  She was seen by a clinic provider yesterday who initiated a steroid Dosepak and order an outpatient MRI to evaluate for lumbar radiculopathy.  Patient also reports some swelling, this is minimal on exam.  DVT ultrasound was obtained to rule out thrombus and this was negative.  Also reviewed reassuring inflammatory markers from within the last couple of months while the symptoms were still reportedly ongoing.  She also had recent plain films negative for any acute bony process.  I do think her symptoms are possibly consistent with a lumbar radiculopathy/sciatica versus more of a complex regional pain syndrome type of presentation.  She is already on steroids, Tylenol, and ibuprofen.  We discussed that she can utilize gabapentin or Flexeril if she finds these agents helpful for further pain management.  She was advised to continue following up with her clinic provider and pursuing the MRI and PT referrals previously placed.  Patient was discharged in stable condition.    At the conclusion of the encounter I discussed the results of all of the tests and the disposition. The questions were answered. The patient or family acknowledged understanding and was agreeable with the care plan.     1:03 AM I met the patient and performed my initial interview and exam.  2:04 AM We discussed the plan for discharge and the patient is agreeable.  All questions were addressed.     Medical Decision Making  Obtained supplemental history:Supplemental history obtained?: No  Reviewed external records: External records reviewed?: Documented in chart  Care impacted by chronic illness:Other: migraine  Care significantly affected by social determinants of health:N/A  Did you consider but not order tests?: Work up considered but not performed and documented in chart, if applicable  Did you interpret images independently?: Independent interpretation of ECG and images noted in documentation, when applicable.  Consultation discussion with other provider:Did you involve another provider (consultant, , pharmacy, etc.)?: No  Discharge. I prescribed additional prescription strength medication(s) as charted. See documentation for any additional details.        MEDICATIONS GIVEN IN THE EMERGENCY:  Medications   ketorolac (TORADOL) injection 15 mg (15 mg Intramuscular $Given 8/7/24 0115)   cyclobenzaprine (FLEXERIL) tablet 10 mg (10 mg Oral $Given 8/7/24 0115)       NEW PRESCRIPTIONS STARTED AT TODAY'S ER VISIT  New Prescriptions    CYCLOBENZAPRINE (FLEXERIL) 10 MG TABLET    Take 1 tablet (10 mg) by mouth 3 times daily as needed for muscle spasms    GABAPENTIN (NEURONTIN) 100 MG CAPSULE    Take 1 capsule (100 mg) by mouth 3 times daily          =================================================================    HPI    Patient information was obtained from: patient    Use of : N/A       Smith Rogel is a 24 year old female with a pertinent history of migraine, ongoing bilateral leg swelling, who presents to this ED via walk-in for evaluation of leg pain and swelling.    In Jan-2024, the patient began noting leg swelling, particularly in the right leg. The patient has been seeing providers and specialists since with source found. She also notes some right leg pain, with the focus of the pain changing; some days it's focused in the right knee, sometimes in the calf,  etc. The pain also shoots up the leg occasionally. Yesterday the patient went to a spine clinic and was given prednisone for pain and swelling, but she hasn't seen much improvement yet. Now, the patient notes leg pain focused in the thigh and swelling to the leg, as well as lower back pain.     The patient notes a well-healed tibia injury 10 years ago, but denies any trauma or injury to the leg otherwise. Use of Tylenol and ibuprofen helps, and lidocaine patches help with her back pain.    Per chart review, the patient presented to [facility] ER on 10-Dec-2023 for evaluation of leg swelling. Symptoms occurring for 1 month at that point. No shortness of breath or chest pain. CBC and metabolic panel were clinically unremarkable. Troponin negative, BNP negative, D-dimer negative, pregnancy screen negative. Patient discharged.    Per chart review, the patient presented to [facility] on 05-Aug-2024 for evaluation of back pain and leg swelling. Patient with right lumbar radiculopathy and chronic right sided low back pain. Spine MRI ordered for future. Referred to physical therapy. Prescribed Medrol Dosepak for symptoms.     REVIEW OF SYSTEMS   All systems reviewed and negative except as noted in HPI.    PAST MEDICAL HISTORY:  Past Medical History:   Diagnosis Date    Depressive disorder     NO ACTIVE PROBLEMS        PAST SURGICAL HISTORY:  Past Surgical History:   Procedure Laterality Date    NO PAST SURGERIES      none             CURRENT MEDICATIONS:    No current facility-administered medications for this encounter.     Current Outpatient Medications   Medication Sig Dispense Refill    cyclobenzaprine (FLEXERIL) 10 MG tablet Take 1 tablet (10 mg) by mouth 3 times daily as needed for muscle spasms 20 tablet 0    gabapentin (NEURONTIN) 100 MG capsule Take 1 capsule (100 mg) by mouth 3 times daily 30 capsule 0    cetirizine (ZYRTEC) 10 MG tablet Take 10 mg by mouth daily       predniSONE (DELTASONE) 10 MG tablet Prednisone 10 mg tablet, 3 tablets p.o. daily for 3 days, then 2 tablets p.o. daily for 3 days, then 1 tablet p.o. daily for 3 days then stop. 18 tablet 0         ALLERGIES:  Allergies   Allergen Reactions    Benzonatate Other (See Comments)     Blurred vision, burning eyes    No Known Allergies        FAMILY HISTORY:  Family History   Problem Relation Age of Onset    Thyroid Disease Sister        SOCIAL HISTORY:   Social History     Socioeconomic History    Marital status: Single   Tobacco Use    Smoking status: Never    Smokeless tobacco: Never    Tobacco comments:     smoke free household.   Substance and Sexual Activity    Alcohol use: No    Drug use: No    Sexual activity: Not Currently     Partners: Male     Birth control/protection: Pull-out method, Condom   Other Topics Concern    Parent/sibling w/ CABG, MI or angioplasty before 65F 55M? No   Social History Narrative    ** Merged History Encounter **          Social Determinants of Health     Financial Resource Strain: Low Risk  (6/10/2024)    Financial Resource Strain     Within the past 12 months, have you or your family members you live with been unable to get utilities (heat, electricity) when it was really needed?: No   Food Insecurity: Low Risk  (6/10/2024)    Food Insecurity     Within the past 12 months, did you worry that your food would run out before you got money to buy more?: No     Within the past 12 months, did the food you bought just not last and you didn t have money to get more?: No   Transportation Needs: Low Risk  (6/10/2024)    Transportation Needs     Within the past 12 months, has lack of transportation kept you from medical appointments, getting your medicines, non-medical meetings or appointments, work, or from getting things that you need?: No   Stress: Stress Concern Present (6/10/2024)    [employer] - Occupational Stress Questionnaire     Feeling of Stress : Very  "much   Social Connections: Unknown (6/10/2024)    Social Connection and Isolation Panel [NHANES]     Frequency of Social Gatherings with Friends and Family: More than three times a week   Interpersonal Safety: Low Risk  (6/11/2024)    Interpersonal Safety     Do you feel physically and emotionally safe where you currently live?: Yes     Within the past 12 months, have you been hit, slapped, kicked or otherwise physically hurt by someone?: No     Within the past 12 months, have you been humiliated or emotionally abused in other ways by your partner or ex-partner?: No   Housing Stability: Low Risk  (6/10/2024)    Housing Stability     Do you have housing? : Yes     Are you worried about losing your housing?: No       VITALS:  /71   Pulse 98   Temp 99  F (37.2  C)   Resp 16   Ht 1.626 m (5' 4\")   Wt 72.1 kg (159 lb)   LMP 08/02/2024 (Exact Date)   SpO2 100%   BMI 27.29 kg/m      PHYSICAL EXAM    Constitutional: Well developed, Well nourished, NAD.  HENT: Normocephalic, Atraumatic. Neck Supple.  Eyes: EOMI, Conjunctiva normal.  Respiratory: Breathing comfortably on room air. Speaks full sentences easily. Lungs clear to ascultation.  Cardiovascular: Normal heart rate, Regular rhythm. No peripheral edema.  Abdomen: Soft, nontender  Musculoskeletal: Good range of motion in all major joints. No major deformities noted.  Integument: Warm, Dry.  Neurologic: Alert & awake, Normal motor function, Normal sensory function, No focal deficits noted.  Strength is 5 out of 5 with hip flexion, knee extension, plantarflexion and dorsiflexion bilaterally.  Sensation light touch intact including no saddle anesthesia.  Psychiatric: Cooperative. Affect appropriate.     RADIOLOGY:  Reviewed all pertinent imaging. Please see official radiology report.  US Lower Extremity Venous Duplex Right   Final Result   IMPRESSION:   1.  No deep venous thrombosis in the right lower extremity.          I, Ed Pederson, am serving as a " scribe to document services personally performed by Dr. Kem Soriano, based on my observation and the provider's statements to me. I, Kem Soriano MD attest that Ed Pederson is acting in a scribe capacity, has observed my performance of the services and has documented them in accordance with my direction.    Kem Soriano M.D.  Emergency Medicine  [facility] EMERGENCY DEPARTMENT  [address] [place] [id number] [phone]  Dept: [phone]       Kem Soriano MD  08/07/24 0237

## 2024-08-07 NOTE — ED TRIAGE NOTES
Patient reports that she has been having pain and swelling in her right leg since January. She had an injury to the right leg 10 years ago. She has been following up with her primary and saw a neurology specialist yesterday for the first time because her primary thought it may be nerve related because they did not see any bone issues. They prescribed her pain medication but she reports the pain and swelling are worse today.    Triage Assessment (Adult)       Row Name 08/07/24 0052          Triage Assessment    Airway WDL WDL        Respiratory WDL    Respiratory WDL X        Cardiac WDL    Cardiac WDL WDL        Peripheral/Neurovascular WDL    Peripheral Neurovascular WDL X        Cognitive/Neuro/Behavioral WDL    Cognitive/Neuro/Behavioral WDL WDL

## 2024-08-07 NOTE — ED NOTES
Pt stated not much change to pain after IM toradol. AVS discussed, questions encouraged and answered, discussed new medications and when to take. Pt discharged home.

## 2025-07-19 ENCOUNTER — HEALTH MAINTENANCE LETTER (OUTPATIENT)
Age: 25
End: 2025-07-19